# Patient Record
Sex: MALE | Race: WHITE | Employment: FULL TIME | URBAN - METROPOLITAN AREA
[De-identification: names, ages, dates, MRNs, and addresses within clinical notes are randomized per-mention and may not be internally consistent; named-entity substitution may affect disease eponyms.]

---

## 2017-07-05 ENCOUNTER — TELEPHONE (OUTPATIENT)
Dept: FAMILY MEDICINE CLINIC | Facility: CLINIC | Age: 42
End: 2017-07-05

## 2017-07-05 ENCOUNTER — HOSPITAL ENCOUNTER (OUTPATIENT)
Dept: GENERAL RADIOLOGY | Age: 42
Discharge: HOME | End: 2017-07-05
Attending: FAMILY MEDICINE
Payer: COMMERCIAL

## 2017-07-05 DIAGNOSIS — R07.89 ATYPICAL CHEST PAIN: ICD-10-CM

## 2017-07-05 DIAGNOSIS — R50.9 FEVER, UNSPECIFIED FEVER CAUSE: ICD-10-CM

## 2017-07-05 PROCEDURE — 71020 XR CHEST PA + LAT CHEST (CPT=71020): CPT | Performed by: FAMILY MEDICINE

## 2017-07-05 NOTE — TELEPHONE ENCOUNTER
Heather at [facility]  Radiology calling. Stat chest x ray is negative. Agreed to note and inform .

## 2017-07-05 NOTE — PROGRESS NOTES
HPI:   Julene Schirmer is a 43year old male who presents with dyspnea and med follow up     Pt reports it started after a deep tissue massage one week ago ; dyspnea, pain with moving and coughing   + sick contacts  Pt denies c/c  No ear pain   No throat pain apparent distress  CARDIO: RRR without murmur  LUNGS: clear to auscultation  NECK: supple,no adenopathy  HEENT: atraumatic, normocephalic,ears and throat are clear  EYES:PERRLA, EOMI, normal,conjunctiva are clear  SKIN: norashes,no suspicious lesions  GI:

## 2017-07-12 ENCOUNTER — LAB ENCOUNTER (OUTPATIENT)
Dept: LAB | Age: 42
End: 2017-07-12
Attending: FAMILY MEDICINE
Payer: COMMERCIAL

## 2017-07-12 DIAGNOSIS — E78.00 ELEVATED CHOLESTEROL: ICD-10-CM

## 2017-07-12 DIAGNOSIS — R50.9 FEVER, UNSPECIFIED FEVER CAUSE: ICD-10-CM

## 2017-07-12 DIAGNOSIS — F41.9 ANXIETY: ICD-10-CM

## 2017-07-12 LAB
25-HYDROXYVITAMIN D (TOTAL): 31.7 NG/ML (ref 30–100)
ALBUMIN SERPL-MCNC: 4.1 G/DL (ref 3.5–4.8)
ALP LIVER SERPL-CCNC: 53 U/L (ref 45–117)
ALT SERPL-CCNC: 43 U/L (ref 17–63)
AST SERPL-CCNC: 15 U/L (ref 15–41)
BASOPHILS # BLD AUTO: 0.05 X10(3) UL (ref 0–0.1)
BASOPHILS NFR BLD AUTO: 0.7 %
BILIRUB SERPL-MCNC: 0.8 MG/DL (ref 0.1–2)
BUN BLD-MCNC: 19 MG/DL (ref 8–20)
CALCIUM BLD-MCNC: 9.6 MG/DL (ref 8.3–10.3)
CHLORIDE: 102 MMOL/L (ref 101–111)
CHOLEST SMN-MCNC: 149 MG/DL (ref ?–200)
CO2: 29 MMOL/L (ref 22–32)
CREAT BLD-MCNC: 1.26 MG/DL (ref 0.7–1.3)
EOSINOPHIL # BLD AUTO: 0.36 X10(3) UL (ref 0–0.3)
EOSINOPHIL NFR BLD AUTO: 5 %
ERYTHROCYTE [DISTWIDTH] IN BLOOD BY AUTOMATED COUNT: 12.7 % (ref 11.5–16)
FREE T4: 1.1 NG/DL (ref 0.9–1.8)
GLUCOSE BLD-MCNC: 97 MG/DL (ref 70–99)
HAV AB SERPL IA-ACNC: 1010 PG/ML (ref 193–986)
HCT VFR BLD AUTO: 45.1 % (ref 37–53)
HDLC SERPL-MCNC: 43 MG/DL (ref 45–?)
HDLC SERPL: 3.47 {RATIO} (ref ?–4.97)
HGB BLD-MCNC: 14.9 G/DL (ref 13–17)
IMMATURE GRANULOCYTE COUNT: 0.06 X10(3) UL (ref 0–1)
IMMATURE GRANULOCYTE RATIO %: 0.8 %
LDLC SERPL CALC-MCNC: 81 MG/DL (ref ?–130)
LYMPHOCYTES # BLD AUTO: 2.52 X10(3) UL (ref 0.9–4)
LYMPHOCYTES NFR BLD AUTO: 34.9 %
M PROTEIN MFR SERPL ELPH: 8 G/DL (ref 6.1–8.3)
MCH RBC QN AUTO: 28.5 PG (ref 27–33.2)
MCHC RBC AUTO-ENTMCNC: 33 G/DL (ref 31–37)
MCV RBC AUTO: 86.2 FL (ref 80–99)
MONOCYTES # BLD AUTO: 0.68 X10(3) UL (ref 0.1–0.6)
MONOCYTES NFR BLD AUTO: 9.4 %
NEUTROPHIL ABS PRELIM: 3.56 X10 (3) UL (ref 1.3–6.7)
NEUTROPHILS # BLD AUTO: 3.56 X10(3) UL (ref 1.3–6.7)
NEUTROPHILS NFR BLD AUTO: 49.2 %
NONHDLC SERPL-MCNC: 106 MG/DL (ref ?–130)
PLATELET # BLD AUTO: 276 10(3)UL (ref 150–450)
POTASSIUM SERPL-SCNC: 4.7 MMOL/L (ref 3.6–5.1)
RBC # BLD AUTO: 5.23 X10(6)UL (ref 4.3–5.7)
RED CELL DISTRIBUTION WIDTH-SD: 39.6 FL (ref 35.1–46.3)
SODIUM SERPL-SCNC: 139 MMOL/L (ref 136–144)
TRIGLYCERIDES: 126 MG/DL (ref ?–150)
TSI SER-ACNC: 1.5 MIU/ML (ref 0.35–5.5)
VLDL: 25 MG/DL (ref 5–40)
WBC # BLD AUTO: 7.2 X10(3) UL (ref 4–13)

## 2017-07-12 PROCEDURE — 36415 COLL VENOUS BLD VENIPUNCTURE: CPT | Performed by: FAMILY MEDICINE

## 2017-07-12 PROCEDURE — 80050 GENERAL HEALTH PANEL: CPT | Performed by: FAMILY MEDICINE

## 2017-07-12 PROCEDURE — 82306 VITAMIN D 25 HYDROXY: CPT | Performed by: FAMILY MEDICINE

## 2017-07-12 PROCEDURE — 84439 ASSAY OF FREE THYROXINE: CPT | Performed by: FAMILY MEDICINE

## 2017-07-12 PROCEDURE — 80061 LIPID PANEL: CPT | Performed by: FAMILY MEDICINE

## 2017-07-12 PROCEDURE — 82607 VITAMIN B-12: CPT | Performed by: FAMILY MEDICINE

## 2017-10-27 ENCOUNTER — OFFICE VISIT (OUTPATIENT)
Dept: FAMILY MEDICINE CLINIC | Facility: CLINIC | Age: 42
End: 2017-10-27

## 2017-10-27 VITALS
TEMPERATURE: 98 F | BODY MASS INDEX: 28.84 KG/M2 | WEIGHT: 206 LBS | RESPIRATION RATE: 18 BRPM | HEIGHT: 71 IN | DIASTOLIC BLOOD PRESSURE: 72 MMHG | SYSTOLIC BLOOD PRESSURE: 124 MMHG | OXYGEN SATURATION: 97 % | HEART RATE: 74 BPM

## 2017-10-27 DIAGNOSIS — R53.83 OTHER FATIGUE: ICD-10-CM

## 2017-10-27 DIAGNOSIS — R06.83 SNORING: Primary | ICD-10-CM

## 2017-10-27 DIAGNOSIS — E66.3 OVERWEIGHT: ICD-10-CM

## 2017-10-27 PROCEDURE — 99213 OFFICE O/P EST LOW 20 MIN: CPT | Performed by: PHYSICIAN ASSISTANT

## 2017-10-27 PROCEDURE — 90686 IIV4 VACC NO PRSV 0.5 ML IM: CPT | Performed by: PHYSICIAN ASSISTANT

## 2017-10-27 PROCEDURE — 90471 IMMUNIZATION ADMIN: CPT | Performed by: PHYSICIAN ASSISTANT

## 2017-10-27 NOTE — PROGRESS NOTES
HPI:    Patient ID: Bijal Stanton is a 43year old male. HPI  Pt presents to clinic to discuss sleep study. Has been told he snores. Getting progressively louder. Has been told he stops breathing during sleep and wakes up for gasping for air.  He has trie atraumatic. Right Ear: External ear normal.   Left Ear: External ear normal.   Mouth/Throat: Oropharynx is clear and moist.   Tonsils are absent. Neck: Neck supple. Neck circumference 15.25 inches.     Cardiovascular: Normal rate, regular rhythm and

## 2017-12-17 DIAGNOSIS — F41.9 ANXIETY: ICD-10-CM

## 2017-12-17 DIAGNOSIS — E78.00 ELEVATED CHOLESTEROL: ICD-10-CM

## 2017-12-18 RX ORDER — ROSUVASTATIN CALCIUM 20 MG/1
20 TABLET, COATED ORAL NIGHTLY
Qty: 90 TABLET | Refills: 0 | Status: SHIPPED | OUTPATIENT
Start: 2017-12-18 | End: 2018-07-20

## 2017-12-18 RX ORDER — PAROXETINE HYDROCHLORIDE 40 MG/1
40 TABLET, FILM COATED ORAL EVERY EVENING
Qty: 90 TABLET | Refills: 0 | Status: SHIPPED | OUTPATIENT
Start: 2017-12-18 | End: 2018-07-20

## 2017-12-18 NOTE — TELEPHONE ENCOUNTER
From: Shadi   Sent: 12/17/2017 5:54 PM CST  Subject: Medication Renewal Request    Deja Taylor.  Mariah Hurtado would like a refill of the following medications:     PARoxetine HCl 40 MG Oral Tab Lauren Vides DOJasmin     Rosuvastatin Calcium 20 MG Oral Tab Federico Plasencia

## 2018-01-15 ENCOUNTER — OFFICE VISIT (OUTPATIENT)
Dept: FAMILY MEDICINE CLINIC | Facility: CLINIC | Age: 43
End: 2018-01-15

## 2018-01-15 ENCOUNTER — APPOINTMENT (OUTPATIENT)
Dept: LAB | Age: 43
End: 2018-01-15
Attending: INTERNAL MEDICINE
Payer: COMMERCIAL

## 2018-01-15 VITALS
WEIGHT: 210 LBS | HEIGHT: 71 IN | RESPIRATION RATE: 20 BRPM | TEMPERATURE: 99 F | SYSTOLIC BLOOD PRESSURE: 124 MMHG | OXYGEN SATURATION: 97 % | HEART RATE: 85 BPM | BODY MASS INDEX: 29.4 KG/M2 | DIASTOLIC BLOOD PRESSURE: 82 MMHG

## 2018-01-15 DIAGNOSIS — R06.02 SHORTNESS OF BREATH: ICD-10-CM

## 2018-01-15 DIAGNOSIS — M54.6 ACUTE BILATERAL THORACIC BACK PAIN: ICD-10-CM

## 2018-01-15 DIAGNOSIS — M54.6 ACUTE BILATERAL THORACIC BACK PAIN: Primary | ICD-10-CM

## 2018-01-15 LAB — D-DIMER: 0.31 UG/ML FEU (ref 0–0.49)

## 2018-01-15 PROCEDURE — 85378 FIBRIN DEGRADE SEMIQUANT: CPT | Performed by: INTERNAL MEDICINE

## 2018-01-15 PROCEDURE — 99214 OFFICE O/P EST MOD 30 MIN: CPT | Performed by: INTERNAL MEDICINE

## 2018-01-15 PROCEDURE — 36415 COLL VENOUS BLD VENIPUNCTURE: CPT | Performed by: INTERNAL MEDICINE

## 2018-01-15 RX ORDER — PREDNISONE 20 MG/1
TABLET ORAL
Qty: 8 TABLET | Refills: 0 | Status: SHIPPED | OUTPATIENT
Start: 2018-01-15 | End: 2018-04-03 | Stop reason: ALTCHOICE

## 2018-01-15 RX ORDER — IBUPROFEN 800 MG/1
800 TABLET ORAL
Qty: 10 TABLET | Refills: 0 | Status: SHIPPED | OUTPATIENT
Start: 2018-01-15 | End: 2018-01-20

## 2018-01-16 ENCOUNTER — TELEPHONE (OUTPATIENT)
Dept: FAMILY MEDICINE CLINIC | Facility: CLINIC | Age: 43
End: 2018-01-16

## 2018-01-16 NOTE — PROGRESS NOTES
Leanna Bejarano is a 43year old male. HPI:   Reports bilateral middle back pain only felt with laying down at night. Might have some slight shortness of breath with it sometimes, not always.  Reports history of pleurisy about 5 months ago that was similar bu NECK: supple,no adenopathy   LUNGS: CTA, easy breathing  CV: normal S1S2, RRR without murmur  GI: good BS's,no masses or tenderness  MS: no thoracolumbar spinal pain or paraspinal pain on palpation; no swelling; laying supine does not antagonize pain; no

## 2018-02-25 ENCOUNTER — OFFICE VISIT (OUTPATIENT)
Dept: SLEEP CENTER | Facility: HOSPITAL | Age: 43
End: 2018-02-25
Attending: PHYSICIAN ASSISTANT
Payer: COMMERCIAL

## 2018-02-25 PROCEDURE — 95810 POLYSOM 6/> YRS 4/> PARAM: CPT

## 2018-03-02 NOTE — PROCEDURES
[address] [address],[facility] [address]       Accredited by the [facility] ([facility])    PATIENT'S NAME:        [place] Divers  ATTENDING PHYSICIAN:   Cherise Cross M.D.   REFERRING PHYSICIAN:   VIPIN Will significant REM predominance. The oxygen divya is 80%. The patient spent 96% of the record with a saturation of greater than 90%. PERIODIC LIMB MOVEMENTS:  PLM index is 7. PLM arousal index is 4.     EEG:  With the limited recording montage, no EEG ab

## 2018-07-20 DIAGNOSIS — E78.00 ELEVATED CHOLESTEROL: ICD-10-CM

## 2018-07-20 DIAGNOSIS — F41.9 ANXIETY: ICD-10-CM

## 2018-07-20 RX ORDER — ROSUVASTATIN CALCIUM 20 MG/1
TABLET, COATED ORAL
Qty: 22 TABLET | Refills: 0 | Status: SHIPPED | OUTPATIENT
Start: 2018-07-20 | End: 2018-09-27

## 2018-07-20 RX ORDER — PAROXETINE HYDROCHLORIDE 40 MG/1
TABLET, FILM COATED ORAL
Qty: 22 TABLET | Refills: 0 | Status: SHIPPED | OUTPATIENT
Start: 2018-07-20 | End: 2018-09-27

## 2018-09-27 DIAGNOSIS — F41.9 ANXIETY: ICD-10-CM

## 2018-09-27 DIAGNOSIS — E78.00 ELEVATED CHOLESTEROL: ICD-10-CM

## 2018-09-28 RX ORDER — PAROXETINE HYDROCHLORIDE 40 MG/1
40 TABLET, FILM COATED ORAL EVERY MORNING
Qty: 30 TABLET | Refills: 0 | Status: SHIPPED | OUTPATIENT
Start: 2018-09-28 | End: 2019-01-06

## 2018-09-28 RX ORDER — ROSUVASTATIN CALCIUM 20 MG/1
20 TABLET, COATED ORAL NIGHTLY
Qty: 30 TABLET | Refills: 0 | Status: SHIPPED | OUTPATIENT
Start: 2018-09-28 | End: 2019-01-15

## 2018-11-26 ENCOUNTER — LAB ENCOUNTER (OUTPATIENT)
Dept: LAB | Age: 43
End: 2018-11-26
Attending: PHYSICIAN ASSISTANT
Payer: COMMERCIAL

## 2018-11-26 ENCOUNTER — OFFICE VISIT (OUTPATIENT)
Dept: FAMILY MEDICINE CLINIC | Facility: CLINIC | Age: 43
End: 2018-11-26
Payer: COMMERCIAL

## 2018-11-26 VITALS
WEIGHT: 186.63 LBS | HEIGHT: 71 IN | DIASTOLIC BLOOD PRESSURE: 80 MMHG | OXYGEN SATURATION: 98 % | HEART RATE: 68 BPM | TEMPERATURE: 98 F | BODY MASS INDEX: 26.13 KG/M2 | SYSTOLIC BLOOD PRESSURE: 118 MMHG | RESPIRATION RATE: 20 BRPM

## 2018-11-26 DIAGNOSIS — R10.11 RUQ ABDOMINAL PAIN: ICD-10-CM

## 2018-11-26 DIAGNOSIS — E78.01 FAMILIAL HYPERCHOLESTEROLEMIA: ICD-10-CM

## 2018-11-26 DIAGNOSIS — R10.11 RUQ ABDOMINAL PAIN: Primary | ICD-10-CM

## 2018-11-26 DIAGNOSIS — Z23 NEED FOR VACCINATION: ICD-10-CM

## 2018-11-26 PROCEDURE — 80053 COMPREHEN METABOLIC PANEL: CPT | Performed by: PHYSICIAN ASSISTANT

## 2018-11-26 PROCEDURE — 90686 IIV4 VACC NO PRSV 0.5 ML IM: CPT | Performed by: PHYSICIAN ASSISTANT

## 2018-11-26 PROCEDURE — 85025 COMPLETE CBC W/AUTO DIFF WBC: CPT | Performed by: PHYSICIAN ASSISTANT

## 2018-11-26 PROCEDURE — 90471 IMMUNIZATION ADMIN: CPT | Performed by: PHYSICIAN ASSISTANT

## 2018-11-26 PROCEDURE — 80074 ACUTE HEPATITIS PANEL: CPT | Performed by: PHYSICIAN ASSISTANT

## 2018-11-26 PROCEDURE — 36415 COLL VENOUS BLD VENIPUNCTURE: CPT | Performed by: PHYSICIAN ASSISTANT

## 2018-11-26 PROCEDURE — 83690 ASSAY OF LIPASE: CPT | Performed by: PHYSICIAN ASSISTANT

## 2018-11-26 PROCEDURE — 99214 OFFICE O/P EST MOD 30 MIN: CPT | Performed by: PHYSICIAN ASSISTANT

## 2018-11-26 PROCEDURE — 80061 LIPID PANEL: CPT | Performed by: PHYSICIAN ASSISTANT

## 2018-11-26 NOTE — PROGRESS NOTES
Castillo Brenner is a 37year old male who presents with abdominal pain. Pain is located at RUQ. Pain is described as pressure, feels like there is something under his right ribs that is enlarged and doesn't fit. Severity is off and on.  Associated symptoms: date: 4/25/1993      Smokeless tobacco: Former User        Types: Chew    Alcohol use: Yes    Drug use: No        REVIEW OF SYSTEMS:   GENERAL: no lethargy, no fever, no chills  SKIN: no rash.   HEENT: denies congestion  LUNGS: denies shortness of breath or

## 2018-12-01 ENCOUNTER — HOSPITAL ENCOUNTER (OUTPATIENT)
Dept: ULTRASOUND IMAGING | Age: 43
Discharge: HOME | End: 2018-12-01
Attending: PHYSICIAN ASSISTANT
Payer: COMMERCIAL

## 2018-12-01 DIAGNOSIS — R10.11 RUQ ABDOMINAL PAIN: ICD-10-CM

## 2018-12-01 PROCEDURE — 76700 US EXAM ABDOM COMPLETE: CPT | Performed by: PHYSICIAN ASSISTANT

## 2018-12-20 ENCOUNTER — OFFICE VISIT (OUTPATIENT)
Dept: NEPHROLOGY | Facility: CLINIC | Age: 43
End: 2018-12-20
Payer: COMMERCIAL

## 2018-12-20 VITALS — BODY MASS INDEX: 25 KG/M2 | WEIGHT: 181 LBS | DIASTOLIC BLOOD PRESSURE: 88 MMHG | SYSTOLIC BLOOD PRESSURE: 120 MMHG

## 2018-12-20 DIAGNOSIS — N28.89 RENAL MASS: Primary | ICD-10-CM

## 2018-12-20 DIAGNOSIS — R10.11 RIGHT UPPER QUADRANT ABDOMINAL PAIN: ICD-10-CM

## 2018-12-20 PROCEDURE — 99243 OFF/OP CNSLTJ NEW/EST LOW 30: CPT | Performed by: INTERNAL MEDICINE

## 2018-12-20 NOTE — PROGRESS NOTES
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     Arun Solano wheezes, rales, rhonchi. Abdomen: Soft, non-tender. + bowel sounds, no palpable organomegaly  Extremities: Without clubbing, cyanosis or edema.   Neurologic:  normal affect, cranial nerves grossly intact, moving all extremities  Skin: Warm and dry, no ra

## 2018-12-26 ENCOUNTER — HOSPITAL ENCOUNTER (OUTPATIENT)
Dept: CT IMAGING | Facility: HOSPITAL | Age: 43
Discharge: HOME | End: 2018-12-26
Attending: INTERNAL MEDICINE
Payer: COMMERCIAL

## 2018-12-26 DIAGNOSIS — R10.11 RIGHT UPPER QUADRANT ABDOMINAL PAIN: ICD-10-CM

## 2018-12-26 DIAGNOSIS — N28.89 RENAL MASS: ICD-10-CM

## 2018-12-26 PROCEDURE — 74170 CT ABD WO CNTRST FLWD CNTRST: CPT | Performed by: INTERNAL MEDICINE

## 2018-12-26 PROCEDURE — 82565 ASSAY OF CREATININE: CPT

## 2018-12-26 NOTE — IMAGING NOTE
Called to CT with pt c/o itchy nose. Upon RN arrival, symptoms resolved. Pt denies all related symptoms. No rash/hives seen by RN. IV removed and pt discharged. Instructions given to continue to monitor and is he develops SOB, call 911.  Verbalized underst

## 2018-12-27 ENCOUNTER — TELEPHONE (OUTPATIENT)
Dept: NEPHROLOGY | Facility: CLINIC | Age: 43
End: 2018-12-27

## 2019-01-06 DIAGNOSIS — F41.9 ANXIETY: ICD-10-CM

## 2019-01-07 RX ORDER — PAROXETINE HYDROCHLORIDE 40 MG/1
40 TABLET, FILM COATED ORAL EVERY MORNING
Qty: 30 TABLET | Refills: 0 | Status: SHIPPED | OUTPATIENT
Start: 2019-01-07

## 2019-01-08 DIAGNOSIS — F41.9 ANXIETY: ICD-10-CM

## 2019-01-08 RX ORDER — PAROXETINE HYDROCHLORIDE 40 MG/1
TABLET, FILM COATED ORAL
Qty: 30 TABLET | Refills: 0 | OUTPATIENT
Start: 2019-01-08

## 2019-01-17 PROBLEM — F41.3 OTHER MIXED ANXIETY DISORDERS: Status: ACTIVE | Noted: 2019-01-17

## 2019-01-17 PROBLEM — F10.10 ALCOHOL USE DISORDER, MILD, ABUSE: Status: ACTIVE | Noted: 2019-01-17

## 2022-01-12 ENCOUNTER — OFFICE VISIT (OUTPATIENT)
Dept: FAMILY MEDICINE CLINIC | Facility: CLINIC | Age: 47
End: 2022-01-12
Payer: COMMERCIAL

## 2022-01-12 VITALS
DIASTOLIC BLOOD PRESSURE: 82 MMHG | HEIGHT: 71 IN | HEART RATE: 62 BPM | OXYGEN SATURATION: 99 % | WEIGHT: 196 LBS | BODY MASS INDEX: 27.44 KG/M2 | RESPIRATION RATE: 16 BRPM | SYSTOLIC BLOOD PRESSURE: 114 MMHG

## 2022-01-12 DIAGNOSIS — Z12.11 COLON CANCER SCREENING: ICD-10-CM

## 2022-01-12 DIAGNOSIS — Z00.00 ANNUAL PHYSICAL EXAM: Primary | ICD-10-CM

## 2022-01-12 DIAGNOSIS — Z23 NEED FOR VACCINATION: ICD-10-CM

## 2022-01-12 PROCEDURE — 3008F BODY MASS INDEX DOCD: CPT | Performed by: FAMILY MEDICINE

## 2022-01-12 PROCEDURE — 90471 IMMUNIZATION ADMIN: CPT | Performed by: FAMILY MEDICINE

## 2022-01-12 PROCEDURE — 99396 PREV VISIT EST AGE 40-64: CPT | Performed by: FAMILY MEDICINE

## 2022-01-12 PROCEDURE — 3074F SYST BP LT 130 MM HG: CPT | Performed by: FAMILY MEDICINE

## 2022-01-12 PROCEDURE — 3079F DIAST BP 80-89 MM HG: CPT | Performed by: FAMILY MEDICINE

## 2022-01-12 PROCEDURE — 90686 IIV4 VACC NO PRSV 0.5 ML IM: CPT | Performed by: FAMILY MEDICINE

## 2022-01-12 RX ORDER — HYDROXYZINE 50 MG/1
TABLET, FILM COATED ORAL
COMMUNITY
Start: 2022-01-09

## 2022-01-12 NOTE — PROGRESS NOTES
Slim Peterson is a 55year old male who presents for a complete physical exam.   HPI:     Pt complains of nothing   No calcium and vit D   No urinary symptoms  No erectile dysfunction  No penile discharge  DENISE - on cpap   No c-scope yet  Dad h/o colon po Cigarettes        Start date: 4/25/1993      Smokeless tobacco: Former User        Types: Chew    Alcohol use: Yes      Alcohol/week: 5.0 standard drinks      Types: 5 Shots of liquor per week    Drug use: No     Occ: . : .  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  Colon can

## 2023-08-28 ENCOUNTER — OFFICE VISIT (OUTPATIENT)
Dept: FAMILY MEDICINE CLINIC | Facility: CLINIC | Age: 48
End: 2023-08-28
Payer: COMMERCIAL

## 2023-08-28 ENCOUNTER — TELEPHONE (OUTPATIENT)
Dept: FAMILY MEDICINE CLINIC | Facility: CLINIC | Age: 48
End: 2023-08-28

## 2023-08-28 ENCOUNTER — LAB ENCOUNTER (OUTPATIENT)
Dept: LAB | Age: 48
End: 2023-08-28
Attending: NURSE PRACTITIONER
Payer: COMMERCIAL

## 2023-08-28 VITALS
SYSTOLIC BLOOD PRESSURE: 112 MMHG | HEIGHT: 71 IN | WEIGHT: 207.81 LBS | BODY MASS INDEX: 29.09 KG/M2 | RESPIRATION RATE: 16 BRPM | HEART RATE: 64 BPM | DIASTOLIC BLOOD PRESSURE: 68 MMHG | TEMPERATURE: 97 F

## 2023-08-28 DIAGNOSIS — Z12.5 SCREENING FOR PROSTATE CANCER: ICD-10-CM

## 2023-08-28 DIAGNOSIS — Z00.00 LABORATORY EXAMINATION ORDERED AS PART OF A ROUTINE GENERAL MEDICAL EXAMINATION: ICD-10-CM

## 2023-08-28 DIAGNOSIS — G47.33 OSA ON CPAP: ICD-10-CM

## 2023-08-28 DIAGNOSIS — Z23 NEED FOR VACCINATION: ICD-10-CM

## 2023-08-28 DIAGNOSIS — E78.01 FAMILIAL HYPERCHOLESTEROLEMIA: ICD-10-CM

## 2023-08-28 DIAGNOSIS — Z00.00 ANNUAL PHYSICAL EXAM: Primary | ICD-10-CM

## 2023-08-28 DIAGNOSIS — Z12.11 SCREENING FOR MALIGNANT NEOPLASM OF COLON: ICD-10-CM

## 2023-08-28 LAB
ALBUMIN SERPL-MCNC: 4 G/DL (ref 3.4–5)
ALBUMIN/GLOB SERPL: 1.2 {RATIO} (ref 1–2)
ALP LIVER SERPL-CCNC: 54 U/L
ALT SERPL-CCNC: 27 U/L
ANION GAP SERPL CALC-SCNC: 5 MMOL/L (ref 0–18)
AST SERPL-CCNC: 13 U/L (ref 15–37)
BASOPHILS # BLD AUTO: 0.08 X10(3) UL (ref 0–0.2)
BASOPHILS NFR BLD AUTO: 1.7 %
BILIRUB SERPL-MCNC: 0.6 MG/DL (ref 0.1–2)
BILIRUB UR QL STRIP.AUTO: NEGATIVE
BUN BLD-MCNC: 20 MG/DL (ref 7–18)
CALCIUM BLD-MCNC: 9 MG/DL (ref 8.5–10.1)
CHLORIDE SERPL-SCNC: 106 MMOL/L (ref 98–112)
CHOLEST SERPL-MCNC: 235 MG/DL (ref ?–200)
CLARITY UR REFRACT.AUTO: CLEAR
CO2 SERPL-SCNC: 28 MMOL/L (ref 21–32)
COMPLEXED PSA SERPL-MCNC: 0.89 NG/ML (ref ?–4)
CREAT BLD-MCNC: 1.25 MG/DL
EGFRCR SERPLBLD CKD-EPI 2021: 71 ML/MIN/1.73M2 (ref 60–?)
EOSINOPHIL # BLD AUTO: 0.11 X10(3) UL (ref 0–0.7)
EOSINOPHIL NFR BLD AUTO: 2.4 %
ERYTHROCYTE [DISTWIDTH] IN BLOOD BY AUTOMATED COUNT: 13.6 %
FASTING PATIENT LIPID ANSWER: YES
FASTING STATUS PATIENT QL REPORTED: YES
GLOBULIN PLAS-MCNC: 3.3 G/DL (ref 2.8–4.4)
GLUCOSE BLD-MCNC: 107 MG/DL (ref 70–99)
GLUCOSE UR STRIP.AUTO-MCNC: NORMAL MG/DL
HCT VFR BLD AUTO: 42.7 %
HDLC SERPL-MCNC: 44 MG/DL (ref 40–59)
HGB BLD-MCNC: 14.2 G/DL
IMM GRANULOCYTES # BLD AUTO: 0 X10(3) UL (ref 0–1)
IMM GRANULOCYTES NFR BLD: 0 %
KETONES UR STRIP.AUTO-MCNC: NEGATIVE MG/DL
LDLC SERPL CALC-MCNC: 173 MG/DL (ref ?–100)
LEUKOCYTE ESTERASE UR QL STRIP.AUTO: NEGATIVE
LYMPHOCYTES # BLD AUTO: 1.54 X10(3) UL (ref 1–4)
LYMPHOCYTES NFR BLD AUTO: 33.4 %
MCH RBC QN AUTO: 28.7 PG (ref 26–34)
MCHC RBC AUTO-ENTMCNC: 33.3 G/DL (ref 31–37)
MCV RBC AUTO: 86.4 FL
MONOCYTES # BLD AUTO: 0.32 X10(3) UL (ref 0.1–1)
MONOCYTES NFR BLD AUTO: 6.9 %
NEUTROPHILS # BLD AUTO: 2.56 X10 (3) UL (ref 1.5–7.7)
NEUTROPHILS # BLD AUTO: 2.56 X10(3) UL (ref 1.5–7.7)
NEUTROPHILS NFR BLD AUTO: 55.6 %
NITRITE UR QL STRIP.AUTO: NEGATIVE
NONHDLC SERPL-MCNC: 191 MG/DL (ref ?–130)
OSMOLALITY SERPL CALC.SUM OF ELEC: 291 MOSM/KG (ref 275–295)
PH UR STRIP.AUTO: 7 [PH] (ref 5–8)
PLATELET # BLD AUTO: 218 10(3)UL (ref 150–450)
POTASSIUM SERPL-SCNC: 4.2 MMOL/L (ref 3.5–5.1)
PROT SERPL-MCNC: 7.3 G/DL (ref 6.4–8.2)
PROT UR STRIP.AUTO-MCNC: NEGATIVE MG/DL
RBC # BLD AUTO: 4.94 X10(6)UL
RBC UR QL AUTO: NEGATIVE
SODIUM SERPL-SCNC: 139 MMOL/L (ref 136–145)
SP GR UR STRIP.AUTO: 1.01 (ref 1–1.03)
TRIGL SERPL-MCNC: 99 MG/DL (ref 30–149)
TSI SER-ACNC: 1.28 MIU/ML (ref 0.36–3.74)
UROBILINOGEN UR STRIP.AUTO-MCNC: NORMAL MG/DL
VLDLC SERPL CALC-MCNC: 20 MG/DL (ref 0–30)
WBC # BLD AUTO: 4.6 X10(3) UL (ref 4–11)

## 2023-08-28 PROCEDURE — 85025 COMPLETE CBC W/AUTO DIFF WBC: CPT

## 2023-08-28 PROCEDURE — 84443 ASSAY THYROID STIM HORMONE: CPT

## 2023-08-28 PROCEDURE — 80053 COMPREHEN METABOLIC PANEL: CPT

## 2023-08-28 PROCEDURE — 80061 LIPID PANEL: CPT

## 2023-08-28 PROCEDURE — 81003 URINALYSIS AUTO W/O SCOPE: CPT

## 2023-08-29 DIAGNOSIS — E78.00 ELEVATED CHOLESTEROL: Primary | ICD-10-CM

## 2023-08-29 DIAGNOSIS — R74.02 ELEVATED LDH: ICD-10-CM

## 2023-09-07 ENCOUNTER — PATIENT MESSAGE (OUTPATIENT)
Dept: FAMILY MEDICINE CLINIC | Facility: CLINIC | Age: 48
End: 2023-09-07

## 2023-09-08 NOTE — TELEPHONE ENCOUNTER
From: Benjamín Shane  To: ALEXA العلي  Sent: 9/7/2023 7:51 PM CDT  Subject: Attn: Mya - [employer] Fax Number    Max Roque,    Thank you for getting the [employer] paperwork all fixed up. The fax number to send it to is:  [phone]    Thank you!   Chelsea Jaramillo

## 2023-09-09 ENCOUNTER — HOSPITAL ENCOUNTER (OUTPATIENT)
Dept: CT IMAGING | Age: 48
Discharge: HOME | End: 2023-09-09
Attending: FAMILY MEDICINE

## 2023-09-09 DIAGNOSIS — Z13.6 SCREENING FOR HEART DISEASE: ICD-10-CM

## 2023-09-20 DIAGNOSIS — E78.01 FAMILIAL HYPERCHOLESTEROLEMIA: Primary | ICD-10-CM

## 2023-09-20 DIAGNOSIS — E78.00 HYPERCHOLESTEROLEMIA: ICD-10-CM

## 2023-09-20 RX ORDER — ROSUVASTATIN CALCIUM 10 MG/1
10 TABLET, COATED ORAL NIGHTLY
Qty: 90 TABLET | Refills: 0 | Status: SHIPPED | OUTPATIENT
Start: 2023-09-20

## 2023-09-26 PROBLEM — Z12.11 SPECIAL SCREENING FOR MALIGNANT NEOPLASM OF COLON: Status: ACTIVE | Noted: 2023-09-26

## 2023-10-09 ENCOUNTER — OFFICE VISIT (OUTPATIENT)
Facility: CLINIC | Age: 48
End: 2023-10-09
Payer: COMMERCIAL

## 2023-10-09 VITALS
SYSTOLIC BLOOD PRESSURE: 116 MMHG | RESPIRATION RATE: 16 BRPM | DIASTOLIC BLOOD PRESSURE: 64 MMHG | OXYGEN SATURATION: 98 % | HEART RATE: 77 BPM | BODY MASS INDEX: 28.98 KG/M2 | TEMPERATURE: 98 F | HEIGHT: 71 IN | WEIGHT: 207 LBS

## 2023-10-09 DIAGNOSIS — G47.19 DAYTIME HYPERSOMNOLENCE: ICD-10-CM

## 2023-10-09 DIAGNOSIS — G47.33 OSA (OBSTRUCTIVE SLEEP APNEA): Primary | ICD-10-CM

## 2023-10-10 ENCOUNTER — TELEPHONE (OUTPATIENT)
Facility: CLINIC | Age: 48
End: 2023-10-10

## 2023-10-10 DIAGNOSIS — G47.33 OSA (OBSTRUCTIVE SLEEP APNEA): Primary | ICD-10-CM

## 2023-10-10 NOTE — TELEPHONE ENCOUNTER
Arrange a NEW auto-titrating CPAP at 6-16 cwp at bedtime as patient is using and benefiting from CPAP machine

## 2023-11-01 ENCOUNTER — MED REC SCAN ONLY (OUTPATIENT)
Dept: FAMILY MEDICINE CLINIC | Facility: CLINIC | Age: 48
End: 2023-11-01

## 2023-12-15 ENCOUNTER — MED REC SCAN ONLY (OUTPATIENT)
Facility: CLINIC | Age: 48
End: 2023-12-15

## 2024-01-22 ENCOUNTER — MED REC SCAN ONLY (OUTPATIENT)
Facility: CLINIC | Age: 49
End: 2024-01-22

## 2024-02-08 ENCOUNTER — OFFICE VISIT (OUTPATIENT)
Facility: CLINIC | Age: 49
End: 2024-02-08
Payer: COMMERCIAL

## 2024-02-08 VITALS
DIASTOLIC BLOOD PRESSURE: 70 MMHG | SYSTOLIC BLOOD PRESSURE: 112 MMHG | OXYGEN SATURATION: 96 % | WEIGHT: 200 LBS | BODY MASS INDEX: 28 KG/M2 | HEIGHT: 71 IN | RESPIRATION RATE: 16 BRPM | HEART RATE: 72 BPM

## 2024-02-08 DIAGNOSIS — G47.33 OSA (OBSTRUCTIVE SLEEP APNEA): Primary | ICD-10-CM

## 2024-02-08 DIAGNOSIS — G47.19 DAYTIME HYPERSOMNOLENCE: ICD-10-CM

## 2024-02-08 DIAGNOSIS — F41.1 ANXIETY STATE: ICD-10-CM

## 2024-02-08 PROCEDURE — 3008F BODY MASS INDEX DOCD: CPT | Performed by: INTERNAL MEDICINE

## 2024-02-08 PROCEDURE — 99214 OFFICE O/P EST MOD 30 MIN: CPT | Performed by: INTERNAL MEDICINE

## 2024-02-08 PROCEDURE — 3074F SYST BP LT 130 MM HG: CPT | Performed by: INTERNAL MEDICINE

## 2024-02-08 PROCEDURE — 3078F DIAST BP <80 MM HG: CPT | Performed by: INTERNAL MEDICINE

## 2024-02-08 NOTE — PROGRESS NOTES
This is a 48 year old male who presents with the following symptoms, risk factors, behaviors or other items associated with sleep problems.    Sleep Apnea:   snoring; gasping/choking; stops breathing; non-refreshing sleep; current CPAP use  Insomnia:  anxiety; job stress  Restless Leg:  urge to move legs when trying to sleep  Parasomnias:   no symptoms of parasomnias  Daytime Problems:  no symptoms of daytime problems    The patient's [place] Sleepiness score is 4/24.

## 2024-02-08 NOTE — PROGRESS NOTES
Pulmonary/Critical Care/Sleep Medicine   Progress Note     PCP: José Rudd MD   Phone: [phone]   Fax: [phone]     Chief Complaint   Patient presents with    Follow - Up     6 month f/u       HPI  I had the pleasure of seeing David Echeverria who is a pleasant 48 year old male who presents for follow up of DENISE    The patient reports using auto CPAP  at 6-16 cmH2O regularly throughout the night for about 7 hours and states that the  PAP machine is quiet and has a heated humidifier.        The patient denies kicking legs at night. Denies  teeth grinding.         He drinks less than 1 pot  caffeine coffee daily       He has pets 2 dogs           Hx of tobacco use: He  reports that he quit smoking about 15 years ago. His smoking use included cigarettes. He started smoking about 30 years ago. He has a 12 pack-year smoking history. He has been exposed to tobacco smoke. He has quit using smokeless tobacco.  His smokeless tobacco use included chew.    Past Medical History:   Diagnosis Date    Anxiety     Depression     Flatulence/gas pain/belching     Food intolerance     Hemorrhoids     Hyperlipidemia 01/01/2000    Have been on Lipitor or Cressor    DENISE (obstructive sleep apnea) 02/25/18 PSG    AHI 42 Supine AHI 63 non-supine AHI 14 Sao2 Attila 80%     Sleep apnea 01/01/2018    Diagnosed this year    Stress       Past Surgical History:   Procedure Laterality Date    HERNIA SURGERY      age 20    TONSILLECTOMY       Allergies   Allergen Reactions    Penicillins HIVES    Honey Bee Venom [Bee Venom]      Swelling      Current Outpatient Medications   Medication Sig Dispense Refill    rosuvastatin (CRESTOR) 10 MG Oral Tab Take 1 tablet (10 mg total) by mouth nightly. 90 tablet 0    PEG 3350-KCl-Na Bicarb-NaCl 420 g Oral Recon Soln Take as directed by physician 4000 mL 0    hydrOXYzine 50 MG Oral Tab       PARoxetine HCl 40 MG Oral Tab Take 1 tablet (40 mg total) by mouth every morning. 30 tablet 0       Social History     Socioeconomic History    Marital status:    Tobacco Use    Smoking status: Former     Packs/day: 1.00     Years: 12.00     Additional pack years: 0.00     Total pack years: 12.00     Types: Cigarettes     Start date: 1993     Quit date: 2009     Years since quitting: 15.1     Passive exposure: Past    Smokeless tobacco: Former     Types: Chew    Tobacco comments:     Takes nicotine mints   Vaping Use    Vaping Use: Never used   Substance and Sexual Activity    Alcohol use: Not Currently    Drug use: No    Sexual activity: Yes     Partners: Female   Other Topics Concern    Caffeine Concern Yes     Comment: half a pot of coffee every morning    Exercise Yes     Comment: walking      Immunization History   Administered Date(s) Administered    Covid-19 Vaccine Pfizer 30 mcg/0.3 ml 2021, 2021, 2021    FLULAVAL 6 months & older 0.5 ml Prefilled syringe (46206) 10/27/2017, 2018, 2022    FLUZONE 6 months and older PFS 0.5 ml (92101) 10/27/2017, 2018    Flublok Quad Influenza Vaccine (49646) 10/25/2020    TDAP 2023      Family History   Problem Relation Age of Onset    Ulcerative Colitis Mother     High Blood Pressure Father          BRAIN HEMORAGE AGE 72     High Cholesterol Father     Cancer Maternal Grandmother     Other (LUPUS) Maternal Grandmother              Diabetes Maternal Grandfather             Cancer Paternal Grandmother              Suicide History Cousin         Review of Systems   Constitutional:  Negative for fatigue, fever and unexpected weight change.   HENT:  Negative for congestion, mouth sores, nosebleeds, postnasal drip, rhinorrhea, sore throat and trouble swallowing.    Eyes:  Negative for visual disturbance.   Respiratory:  Negative for apnea, cough, choking, chest tightness, shortness of breath and wheezing.    Cardiovascular:  Negative for chest pain, palpitations and leg swelling.    Gastrointestinal:  Negative for abdominal pain, constipation, diarrhea, nausea and vomiting.   Genitourinary:  Negative for difficulty urinating.   Musculoskeletal:  Negative for arthralgias, back pain, gait problem and myalgias.   Neurological:  Negative for dizziness, weakness and headaches.   Psychiatric/Behavioral:  Negative for sleep disturbance.        Vitals:    02/08/24 1509   BP: 112/70   Pulse: 72   Resp: 16      SpO2: 96 %  Ht Readings from Last 1 Encounters:   02/08/24 5' 11\" (1.803 m)     Wt Readings from Last 1 Encounters:   02/08/24 200 lb (90.7 kg)     Body mass index is 27.89 kg/m².     Physical Exam  Constitutional:       General: He is not in acute distress.     Appearance: Normal appearance. He is not ill-appearing or diaphoretic.   HENT:      Head: Normocephalic and atraumatic.      Nose: Nose normal. No congestion or rhinorrhea.      Comments: Narrow nares bilaterally      Mouth/Throat:      Mouth: Mucous membranes are moist.      Pharynx: Oropharynx is clear. No oropharyngeal exudate or posterior oropharyngeal erythema.      Comments: Mallampati class III palate   Eyes:      Extraocular Movements: Extraocular movements intact.      Pupils: Pupils are equal, round, and reactive to light.   Cardiovascular:      Rate and Rhythm: Normal rate.      Pulses: Normal pulses.      Heart sounds: Normal heart sounds. No murmur heard.  Pulmonary:      Effort: Pulmonary effort is normal. No respiratory distress.      Breath sounds: Normal breath sounds. No wheezing or rhonchi.   Chest:      Chest wall: No tenderness.   Abdominal:      General: Abdomen is flat. Bowel sounds are normal.      Palpations: Abdomen is soft.   Musculoskeletal:         General: Normal range of motion.   Skin:     General: Skin is warm.   Neurological:      General: No focal deficit present.      Mental Status: He is alert and oriented to person, place, and time.   Psychiatric:         Mood and Affect: Mood normal.          Behavior: Behavior normal.         Thought Content: Thought content normal.         Judgment: Judgment normal.              Labs:  Last BMP  Lab Results   Component Value Date     (H) 08/28/2023    BUN 20 (H) 08/28/2023    CREATSERUM 1.25 08/28/2023    BUNCREA 11.4 11/26/2018    ANIONGAP 5 08/28/2023    GFRAA 100 11/26/2018    GFRNAA 87 11/26/2018    CA 9.0 08/28/2023     08/28/2023    K 4.2 08/28/2023     08/28/2023    CO2 28.0 08/28/2023    OSMOCALC 291 08/28/2023      Last CBC  Lab Results   Component Value Date    WBC 4.6 08/28/2023    RBC 4.94 08/28/2023    HGB 14.2 08/28/2023    HCT 42.7 08/28/2023    MCV 86.4 08/28/2023    MCH 28.7 08/28/2023    MCHC 33.3 08/28/2023    RDW 13.6 08/28/2023    .0 08/28/2023      Last CMP  Lab Results   Component Value Date     (H) 08/28/2023    BUN 20 (H) 08/28/2023    BUNCREA 11.4 11/26/2018    CREATSERUM 1.25 08/28/2023    ANIONGAP 5 08/28/2023    GFR 70 07/12/2017    GFRNAA 87 11/26/2018    GFRAA 100 11/26/2018    CA 9.0 08/28/2023    OSMOCALC 291 08/28/2023    ALKPHO 54 08/28/2023    AST 13 (L) 08/28/2023    ALT 27 08/28/2023    BILT 0.6 08/28/2023    TP 7.3 08/28/2023    ALB 4.0 08/28/2023    GLOBULIN 3.3 08/28/2023     08/28/2023    K 4.2 08/28/2023     08/28/2023    CO2 28.0 08/28/2023      Last Thyroid Function  Lab Results   Component Value Date    T4F 1.1 07/12/2017    TSH 1.280 08/28/2023        Imaging:  [facility]       Accredited by the American Academy of Sleep Medicine (AASM)     PATIENT'S NAME:        POOJA VALENCIA JULISA  ATTENDING PHYSICIAN:   Alnozo Hernandez M.D.  REFERRING PHYSICIAN:   Nahed Qiu D.O.  PATIENT ACCOUNT #:     [id number]        LOCATION:       [facility]  MEDICAL RECORD #:      [id number]        YOB: 1975  DATE OF STUDY:         02/25/2018     SLEEP STUDY REPORT     STUDY TYPE:  Diagnostic polysomnography.      CLINICAL HISTORY:  The patient is a 42-year-old  with a history of snoring, witnessed apneas, and daytime sleepiness.  He has tried mandibular advancement devices in the past without success.     DIAGNOSTIC RECORDING PARAMETERS:  The patient underwent a formal polysomnographic evaluation at the [facility].  The study was acquired in compliance with the AASM Manual for the Scoring of Sleep and Associated Events.  The following parameters were monitored:  EOG, EEG, ECG, chin EMG, left and right tibialis EMG, snore microphone, an oronasal thermal sensor, nasal pressure transducer, respiratory inductance plethysmography, and oxygen saturation via continuous pulse oximetry.  In accordance with AASM recommendations, hypopnea events are scored based on an oxygen saturation more than or equal to 4 percent.  Body position is documented via technician notes every 15 minutes.         SLEEP ARCHITECTURE:  Total recording time 443 minutes, total sleep time 316 minutes, for a sleep efficiency of 71%.  Sleep stage breakdown as follows:  Stage 1 at 65%, slow-wave sleep at 8%, REM sleep at 7%.  Sleep onset latency is 23 minutes, REM onset latency 422 minutes, arousal index is 87.     RESPIRATORY MEASURES:  A total of 223 apneas and hypopneas were detected, all of which were obstructive in nature.  This corresponds to an apnea and hypopnea index of 42.  There is a positional variation, with a supine AHI of 63 versus a lateral AHI of 14.  No significant REM predominance.  The oxygen divya is 80%.  The patient spent 96% of the record with a saturation of greater than 90%.     PERIODIC LIMB MOVEMENTS:  PLM index is 7.  PLM arousal index is 4.     EEG:  With the limited recording montage, no EEG abnormalities were detected.     EKG:  The EKG demonstrates sinus rhythm.     IMPRESSION:  Severe obstructive sleep apnea with a positional variation.     RECOMMENDATIONS:    1.       It would be recommended to treat this sleep apnea with CPAP.  The patient should return to the sleep  lab for a formal CPAP titration.  The orders for this will be handled through my office.  There was a request for consultation upon completion of this study, and the patient will be contacted by my office to schedule this consultation in our sleep clinic.  2.       The patient should avoid driving or operating heavy machinery while sleepy.  3.       The patient should avoid alcohol or sedatives prior to sleep.     Dictated By Alonzo Hernandez M.D.  d:     03/01/2018 16:32:43       [place] Sleepiness Scale: (ESS) score on today's visit is   out of 24.     Score total of 1-6    Normal sleep   Score total of 7-8    Average sleepiness   Score total of 9-24    Abnormal (possibly pathologic) sleepiness       Impression:    Obstructive sleep apnea syndrome (OSAS): Attended sleep study performed on 2/25/2023 showed total of 223 apneas and hypopneas were detected, all of which were obstructive in nature.  This corresponds to an apnea and hypopnea index of 42.  There is a positional variation, with a supine AHI of 63 versus a lateral AHI of 14.  No significant REM predominance.  The oxygen divya is 80%.  The patient spent 96% of the record with a saturation of greater than 90% . Download data on 2/6/2024  for 90 day shows adequate AHI  and compliance   Daytime hypersomnolence/fatigue  Overnight   ;  Body mass index is 27.89 kg/m².  Anxiety   Depression  Hyperlipidemia                               Plan:    Continue  NEW auto-titrating CPAP at 6-16 cwp at bedtime as patient is using and benefiting from CPAP machine   Advised about weight loss   Advised against drowsy driving and to avoid alcoholic beverage and respiratory depressants as these may worsen sleep apnea      Follow up: 6  months with NP and 1 year with Dr. Reed    Thank you for allowing me to participate in your patient care.    YANIRA Reed MD, FACP, FCCP, FAA - Pulmonary/Critical care/Sleep Medicine  Please contact our office if you have any questions or concerns  at [phone]

## 2024-02-08 NOTE — PATIENT INSTRUCTIONS
Plan:    Continue  NEW auto-titrating CPAP at 6-16 cwp at bedtime as patient is using and benefiting from CPAP machine   Advised about weight loss   Advised against drowsy driving and to avoid alcoholic beverage and respiratory depressants as these may worsen sleep apnea      Follow up: 6  months with NP and 1 year with Dr. Derek Reed MD    Obstructive Sleep Apnea  Obstructive sleep apnea is a condition caused by air passages becoming narrowed or blocked during sleep. As a result, breathing stops for short periods. Your body wakes up enough for breathing to start again. But you don't remember it. The cycle of stopped breathing and brief awakenings can repeat dozens of times a night. This prevents the body from getting to the deeper stages of sleep that are needed for good rest.   Signs of sleep apnea include loud snoring, noisy breathing, and gasping sounds during sleep. People with sleep apnea often find they use the bathroom many times during the night. Daytime symptoms include waking up tired after a full night's sleep and waking up with headaches. They can also include feeling very sleepy or falling asleep during the day, and having problems with memory or concentration.   Risk factors for sleep apnea include:  Being overweight  Being assigned male at birth, or being in menopause  Smoking  Using alcohol or sedating medicines  Having enlarged structures in the nose or throat such as enlarged tonsils or adenoids, or extra tissue in the airway  Home care  Lifestyle changes that can help treat snoring and sleep apnea include:   If you're overweight, talk with your healthcare provider about a weight-loss plan for you.  Don't drink alcohol for 3 to 4 hours before bedtime.  Don't take sedating medicines. Ask your healthcare provider about the medicines you take.  If you smoke, talk to your provider about ways to quit. It's important to stay away from secondhand smoke. Don't use e-cigarettes because of their  harmful side effects.  Sleep on your side. This can help prevent gravity from pulling relaxed throat tissues into your breathing passages.  If you have allergies or sinus problems that block your nose, ask your provider for help.  Use positive airway pressure (PAP). Discuss with your provider the benefits of using PAP at home. And talk about the type of PAP that's best for you.  Follow-up care  Follow up with your healthcare provider, or as advised. A diagnosis of sleep apnea is made with a sleep study. Your provider can tell you more about this test.   When to get medical care  See your healthcare provider if you have daytime symptoms of sleep apnea. These include:   Waking up tired after a full night's sleep  Waking up with a headache  Feeling very sleepy or falling asleep during the day  Having problems with memory or concentration  Also talk with your provider if your partner tells you that you snore, gasp for air, or stop breathing while you sleep.   Seeing your provider is important because sleep apnea can make you more likely to have certain health problems. These include high blood pressure, heart attack, stroke, and sexual dysfunction. If you have sleep apnea, talk with your healthcare provider about the best treatments for you.   Jose Alejandro last reviewed this educational content on 5/1/2022  © 3001-6241 [employer]. All rights reserved. This information is not intended as a substitute for professional medical care. Always follow your healthcare professional's instructions.        Continuous Positive Air Pressure (CPAP)     A mask over the nose gently directs air into the throat to keep the airway open.     Continuous positive air pressure (CPAP) uses gentle air pressure to hold the airway open. CPAP is often the most effective treatment for sleep apnea. It works very well as a treatment for adults diagnosed with obstructive sleep apnea with a lot of sleepiness. But keep in mind that it can take  several adjustments before the setup is right for you.   How CPAP works  The CPAP machine  is a small portable pump that sits beside the bed. The pump sends air through a hose, which is held over your nose alone, or nose and mouth by a mask. Mild air pressure is gently pushed through your airway. The air pressure nudges sagging tissues aside. This widens the airway so you can breathe better. CPAP may be combined with other kinds of therapy for sleep apnea.   Types of air pressure treatments  There are different types of CPAP. Your doctor or CPAP technician will help you decide which type is best for you:   Basic CPAP keeps the pressure constant all night long.  A bilevel device (BiPAP) provides more pressure when you breathe in and less when you breathe out. A BiPAP machine also may be set to provide automatic breaths to maintain breathing if you stop breathing while sleeping.  An autoCPAP device automatically adjusts pressure throughout the night and in response to changes such as body position, sleep stage, and snoring.  [employer] last reviewed this educational content on 7/1/2019  © 4870-9735 [employer]. All rights reserved. This information is not intended as a substitute for professional medical care. Always follow your healthcare professional's instructions.

## 2024-04-18 ENCOUNTER — MED REC SCAN ONLY (OUTPATIENT)
Facility: CLINIC | Age: 49
End: 2024-04-18

## 2024-11-11 ENCOUNTER — OFFICE VISIT (OUTPATIENT)
Dept: FAMILY MEDICINE CLINIC | Facility: CLINIC | Age: 49
End: 2024-11-11
Payer: COMMERCIAL

## 2024-11-11 VITALS
HEART RATE: 64 BPM | HEIGHT: 71 IN | BODY MASS INDEX: 29.87 KG/M2 | DIASTOLIC BLOOD PRESSURE: 60 MMHG | TEMPERATURE: 98 F | WEIGHT: 213.38 LBS | SYSTOLIC BLOOD PRESSURE: 102 MMHG | RESPIRATION RATE: 16 BRPM

## 2024-11-11 DIAGNOSIS — Z23 NEED FOR VACCINATION: ICD-10-CM

## 2024-11-11 DIAGNOSIS — Z13.89 SCREENING FOR GENITOURINARY CONDITION: ICD-10-CM

## 2024-11-11 DIAGNOSIS — E78.01 FAMILIAL HYPERCHOLESTEROLEMIA: ICD-10-CM

## 2024-11-11 DIAGNOSIS — Z00.00 LABORATORY EXAMINATION ORDERED AS PART OF A ROUTINE GENERAL MEDICAL EXAMINATION: ICD-10-CM

## 2024-11-11 DIAGNOSIS — Z12.5 SCREENING FOR PROSTATE CANCER: ICD-10-CM

## 2024-11-11 DIAGNOSIS — E78.00 HYPERCHOLESTEREMIA: ICD-10-CM

## 2024-11-11 DIAGNOSIS — Z00.00 ANNUAL PHYSICAL EXAM: Primary | ICD-10-CM

## 2024-11-11 DIAGNOSIS — D22.9 MULTIPLE NEVI: ICD-10-CM

## 2024-11-11 DIAGNOSIS — Z02.89 ENCOUNTER FOR COMPLETION OF FORM WITH PATIENT: ICD-10-CM

## 2024-11-11 DIAGNOSIS — G47.33 OSA ON CPAP: ICD-10-CM

## 2024-11-11 RX ORDER — IMIQUIMOD 12.5 MG/.25G
1 CREAM TOPICAL 2 TIMES DAILY
COMMUNITY
Start: 2024-10-07

## 2024-11-11 NOTE — H&P
David Echeverria is a 49 year old male who presents for a complete physical exam.   HPI:   Patient denies any personal or family of testicular cancer. 1st cousin (paternal side) hx of colon cancer- age 50's. Paternal grandfather- hx of prostate cancer.  Colonoscopy- 2023, due 2033.  Urination changes- denies  ED symptoms- denies  Immunizations- Tdap- up to date. Would like Influenza vaccine today.  Seeing Dr. Paulino- psychiatry. Current regimen working well for him. No thoughts of harming self/others.  Seeing Dr. Reed- + DENISE, CPAP.     + family history of hyercholesterolemia. [facility] 2023- score 0. He did take Crestor x about 2 months but then forgot to have repeat labs and stopped medication. He reports he did not have any side effects that he can recall while taking this medication. Pt is not fasting today.  Lipids 2023- total cholesterol 235, LDL-173    Has form needs completed for [employer].    Wt Readings from Last 6 Encounters:   11/11/24 213 lb 6.4 oz (96.8 kg)   02/08/24 200 lb (90.7 kg)   10/09/23 207 lb (93.9 kg)   09/25/23 205 lb (93 kg)   08/28/23 207 lb 12.8 oz (94.3 kg)   01/12/22 196 lb (88.9 kg)     Body mass index is 29.76 kg/m².     Results for orders placed or performed in visit on 08/28/23   Comp Metabolic Panel (14)    Collection Time: 08/28/23 10:05 AM   Result Value Ref Range    Glucose 107 (H) 70 - 99 mg/dL    Sodium 139 136 - 145 mmol/L    Potassium 4.2 3.5 - 5.1 mmol/L    Chloride 106 98 - 112 mmol/L    CO2 28.0 21.0 - 32.0 mmol/L    Anion Gap 5 0 - 18 mmol/L    BUN 20 (H) 7 - 18 mg/dL    Creatinine 1.25 0.70 - 1.30 mg/dL    Calcium, Total 9.0 8.5 - 10.1 mg/dL    Calculated Osmolality 291 275 - 295 mOsm/kg    eGFR-Cr 71 >=60 mL/min/1.73m2    AST 13 (L) 15 - 37 U/L    ALT 27 16 - 61 U/L    Alkaline Phosphatase 54 45 - 117 U/L    Bilirubin, Total 0.6 0.1 - 2.0 mg/dL    Total Protein 7.3 6.4 - 8.2 g/dL    Albumin 4.0 3.4 - 5.0 g/dL    Globulin  3.3 2.8 - 4.4 g/dL    A/G Ratio 1.2 1.0 - 2.0    Patient  Fasting for CMP? Yes    Lipid Panel    Collection Time: 08/28/23 10:05 AM   Result Value Ref Range    Cholesterol, Total 235 (H) <200 mg/dL    HDL Cholesterol 44 40 - 59 mg/dL    Triglycerides 99 30 - 149 mg/dL    LDL Cholesterol 173 (H) <100 mg/dL    VLDL 20 0 - 30 mg/dL    Non HDL Chol 191 (H) <130 mg/dL    Patient Fasting for Lipid? Yes    Urinalysis with Culture Reflex    Collection Time: 08/28/23 10:05 AM    Specimen: Urine, clean catch   Result Value Ref Range    Urine Color Light-Yellow Yellow    Clarity Urine Clear Clear    Spec Gravity 1.014 1.005 - 1.030    Glucose Urine Normal Normal mg/dL    Bilirubin Urine Negative Negative    Ketones Urine Negative Negative mg/dL    Blood Urine Negative Negative    pH Urine 7.0 5.0 - 8.0    Protein Urine Negative Negative mg/dL    Urobilinogen Urine Normal Normal mg/dL    Nitrite Urine Negative Negative    Leukocyte Esterase Urine Negative Negative    Microscopic Microscopic not indicated    TSH W Reflex To Free T4    Collection Time: 08/28/23 10:05 AM   Result Value Ref Range    TSH 1.280 0.358 - 3.740 mIU/mL   PSA, Total (Screening) [E]    Collection Time: 08/28/23 10:05 AM   Result Value Ref Range    Prostate Specific Antigen Screen 0.89 <=4.00 ng/mL   CBC W/ DIFFERENTIAL    Collection Time: 08/28/23 10:05 AM   Result Value Ref Range    WBC 4.6 4.0 - 11.0 x10(3) uL    RBC 4.94 4.30 - 5.70 x10(6)uL    HGB 14.2 13.0 - 17.5 g/dL    HCT 42.7 39.0 - 53.0 %    .0 150.0 - 450.0 10(3)uL    MCV 86.4 80.0 - 100.0 fL    MCH 28.7 26.0 - 34.0 pg    MCHC 33.3 31.0 - 37.0 g/dL    RDW 13.6 %    Neutrophil Absolute Prelim 2.56 1.50 - 7.70 x10 (3) uL    Neutrophil Absolute 2.56 1.50 - 7.70 x10(3) uL    Lymphocyte Absolute 1.54 1.00 - 4.00 x10(3) uL    Monocyte Absolute 0.32 0.10 - 1.00 x10(3) uL    Eosinophil Absolute 0.11 0.00 - 0.70 x10(3) uL    Basophil Absolute 0.08 0.00 - 0.20 x10(3) uL    Immature Granulocyte Absolute 0.00 0.00 - 1.00 x10(3) uL    Neutrophil % 55.6 %     Lymphocyte % 33.4 %    Monocyte % 6.9 %    Eosinophil % 2.4 %    Basophil % 1.7 %    Immature Granulocyte % 0.0 %       Current Outpatient Medications   Medication Sig Dispense Refill    Imiquimod 5 % External Cream Apply 1 Application topically 2 (two) times daily.      hydrOXYzine 50 MG Oral Tab       PARoxetine HCl 40 MG Oral Tab Take 1 tablet (40 mg total) by mouth every morning. 30 tablet 0      Past Medical History:    Anxiety    Depression    Flatulence/gas pain/belching    Food intolerance    Hemorrhoids    Hyperlipidemia    Have been on Lipitor or Cressor    DENISE (obstructive sleep apnea)    AHI 42 Supine AHI 63 non-supine AHI 14 Sao2 Attila 80%     Sleep apnea    Diagnosed this year    Stress      Past Surgical History:   Procedure Laterality Date    Hernia surgery      age 20    Tonsillectomy        Family History   Problem Relation Age of Onset    Ulcerative Colitis Mother     High Blood Pressure Father          BRAIN HEMORAGE AGE 72     High Cholesterol Father     Cancer Maternal Grandmother     Other (LUPUS) Maternal Grandmother              Diabetes Maternal Grandfather             Cancer Paternal Grandmother              Suicide History Cousin       Social History:  Social History     Socioeconomic History    Marital status:    Tobacco Use    Smoking status: Former     Current packs/day: 0.00     Average packs/day: 1 pack/day for 15.7 years (15.7 ttl pk-yrs)     Types: Cigarettes     Start date: 1993     Quit date: 2009     Years since quitting: 15.8     Passive exposure: Past    Smokeless tobacco: Former     Types: Chew    Tobacco comments:     Takes nicotine mints   Vaping Use    Vaping status: Never Used   Substance and Sexual Activity    Alcohol use: Not Currently    Drug use: No    Sexual activity: Yes     Partners: Female   Other Topics Concern    Caffeine Concern Yes     Comment: half a pot of coffee every morning    Exercise Yes     Comment: walking       Occ: . : Y. Children: Y-4 (19, 17, 8-twins).   Exercise: minimal, walking, hiking .  Diet:  monitors diet     REVIEW OF SYSTEMS:   GENERAL: feels well overall, denies fever or chills, denies change in weight  SKIN: denies any unusual skin lesions  EYES: denies changes in vision  HENT: denies upper respiratory symptoms  LUNGS: denies JESSE, wheezing, cough, orthopnea and PND  CARDIOVASCULAR: denies CP, palpitations, rapid or slow heart rate. Denies BAILEY/lower extremity swelling  GI: denies abdominal pain,denies heartburn, denies n/v/c/d/change in stools/blood in stool/black stool/change in appetite  : denies nocturia or changes in urinary stream, denies scrotal mass/abnormal discharge from urethra  MUSCULOSKELETAL: denies joint or muscle aches or pains  NEURO: denies headaches/dizziness  PSYCH: + mood disorder  HEMATOLOGIC: denies easy bruising/bleeding/anemia/blood clot disorders  ENDOCRINE: denies weight gain/weight loss/enlargement of neck glands/polyuria/polydypsia  ALL/ASTHMA: denies allergic rhinitis/asthma  EXAM:   /60   Pulse 64   Temp 97.7 °F (36.5 °C) (Temporal)   Resp 16   Ht 5' 11\" (1.803 m)   Wt 213 lb 6.4 oz (96.8 kg)   BMI 29.76 kg/m²   Body mass index is 29.76 kg/m².   GENERAL: NAD, pleasant, well developed, normal voice  SKIN: no rashes  HENT: NCAT, EACs clear b/l, TMs normal b/l, nasal turbinatess normal b/l, oropharynx with mmm/clear/normal, gingiva normal, lips normal  EYES: PERRLA, EOMI, conjunctiva non-injected and non-icteric  NECK: supple, no adenopathy/thyromegaly/thyroid nodules/masses  LUNGS: CTA A/P, no wheezes/ronchi/rales/crackles, normal air excursion  CARDIOVASCULAR: RRR, no murmur, no lower extremity edema, pedal and femoral pulses 2+ and symmetric b/l  GI: normoactive BS, non-distended, non-tender to palpation, no HSM/masses/pulsations  : two descended testes, no scrotal masses, no hernia, no penile lesions  RECTAL: external anal  sphincter appears normal, normal rectal tone, no masses,   Prostate: smooth, normal contours, NT, normal size.     Hemoccult- neg Mya L MA present during exam  MUSCULOSKELETAL: Back with normal AROM, no joint swelling, extremities x 4 with normal strength 5/5 and symmetric and with normal AROM/PROM.   EXTREMITIES: no cyanosis, clubbing or edema  NEURO: A&O x3, CN II-XII grossly intact. Reflexes: biceps and patellar 2+ b/l and symmetric. Gait is normal.  PSYCH: normal affect, no apparent thought disorder, average judgement and insight    Immunization History   Administered Date(s) Administered    Covid-19 Vaccine Pfizer 30 mcg/0.3 ml 04/09/2021, 04/26/2021, 12/16/2021    FLULAVAL 6 months & older 0.5 ml Prefilled syringe (23352) 10/27/2017, 11/26/2018, 01/12/2022    FLUZONE 6 months and older PFS 0.5 ml (47798) 10/27/2017, 11/26/2018    Flublok Quad Influenza Vaccine (85535) 10/25/2020    TDAP 08/28/2023   Pended Date(s) Pended    Influenza Vaccine, trivalent (IIV3), PF 0.5mL (70599) 11/11/2024       ASSESSMENT AND PLAN:   David Echeverria is a 49 year old male who presents for a complete physical exam.   Recommend healthy diet including green leafy vegetables, fresh fruits and lean meats.  Aerobic exercise 30 minutes five days a week for cardiovascular fitness and 45-60 minutes 6-7 days a week for weight loss. Advised testicular self exam once a month.  There are no Patient Instructions on file for this visit.      Procedures    CBC With Differential With Platelet    Comp Metabolic Panel (14)    Lipid Panel    Urinalysis with Culture Reflex    TSH W Reflex To Free T4    PSA Total, Screen       1. Annual physical exam    2. Laboratory examination ordered as part of a routine general medical examination  - CBC With Differential With Platelet; Future  - Comp Metabolic Panel (14); Future  - Lipid Panel; Future  - TSH W Reflex To Free T4; Future    3. Screening for genitourinary condition  - Urinalysis with Culture  Reflex; Future    4. Screening for prostate cancer  - PSA Total, Screen; Future    5. Need for vaccination  - Immunization Admin Counseling, 1st Component, 18 years and older  - Fluzone trivalent vaccine, PF 0.5mL, 6mo+ (14073)    6. BMI 29.0-29.9,adult    7. DENISE on CPAP    8. Hypercholesteremia  - Comp Metabolic Panel (14); Future  - Lipid Panel; Future    9. Familial hypercholesterolemia    10. Encounter for completion of form with patient    11. Multiple nevi  - Derm Referral - In Network      Derm referral placed.  Influenza vaccine today.  Continue follow up with specialists.  Focus on healthy diet--low fat, routine exercise, weight loss. Body mass index is 29.76 kg/m².  Await lipids--discussed despite UFHS score of 0, previous + and FH indication for statin therapy. Await results for further recommendations       The patient verbalizes understanding of these recommendations and agrees to the plan.  The patient is asked to return for CPX in 1 year and as needed. Also, for nurse visit in the Fall for influenza immunization.

## 2024-11-12 ENCOUNTER — MED REC SCAN ONLY (OUTPATIENT)
Facility: CLINIC | Age: 49
End: 2024-11-12

## 2024-11-20 ENCOUNTER — LAB ENCOUNTER (OUTPATIENT)
Dept: LAB | Age: 49
End: 2024-11-20
Attending: NURSE PRACTITIONER
Payer: COMMERCIAL

## 2024-11-20 DIAGNOSIS — Z12.5 SCREENING FOR PROSTATE CANCER: ICD-10-CM

## 2024-11-20 DIAGNOSIS — Z00.00 LABORATORY EXAMINATION ORDERED AS PART OF A ROUTINE GENERAL MEDICAL EXAMINATION: ICD-10-CM

## 2024-11-20 DIAGNOSIS — Z13.89 SCREENING FOR GENITOURINARY CONDITION: ICD-10-CM

## 2024-11-20 DIAGNOSIS — E78.00 HYPERCHOLESTEREMIA: ICD-10-CM

## 2024-11-20 LAB
ALBUMIN SERPL-MCNC: 4.7 G/DL (ref 3.2–4.8)
ALBUMIN/GLOB SERPL: 2.1 {RATIO} (ref 1–2)
ALP LIVER SERPL-CCNC: 58 U/L
ALT SERPL-CCNC: 27 U/L
ANION GAP SERPL CALC-SCNC: 4 MMOL/L (ref 0–18)
AST SERPL-CCNC: 17 U/L (ref ?–34)
BASOPHILS # BLD AUTO: 0.08 X10(3) UL (ref 0–0.2)
BASOPHILS NFR BLD AUTO: 1.3 %
BILIRUB SERPL-MCNC: 0.5 MG/DL (ref 0.3–1.2)
BILIRUB UR QL STRIP.AUTO: NEGATIVE
BUN BLD-MCNC: 19 MG/DL (ref 9–23)
CALCIUM BLD-MCNC: 9.6 MG/DL (ref 8.7–10.4)
CHLORIDE SERPL-SCNC: 107 MMOL/L (ref 98–112)
CHOLEST SERPL-MCNC: 259 MG/DL (ref ?–200)
CLARITY UR REFRACT.AUTO: CLEAR
CO2 SERPL-SCNC: 29 MMOL/L (ref 21–32)
COMPLEXED PSA SERPL-MCNC: 0.72 NG/ML (ref ?–4)
CREAT BLD-MCNC: 1.22 MG/DL
EGFRCR SERPLBLD CKD-EPI 2021: 73 ML/MIN/1.73M2 (ref 60–?)
EOSINOPHIL # BLD AUTO: 0.32 X10(3) UL (ref 0–0.7)
EOSINOPHIL NFR BLD AUTO: 5.3 %
ERYTHROCYTE [DISTWIDTH] IN BLOOD BY AUTOMATED COUNT: 13.7 %
FASTING PATIENT LIPID ANSWER: YES
FASTING STATUS PATIENT QL REPORTED: YES
GLOBULIN PLAS-MCNC: 2.2 G/DL (ref 2–3.5)
GLUCOSE BLD-MCNC: 93 MG/DL (ref 70–99)
GLUCOSE UR STRIP.AUTO-MCNC: NORMAL MG/DL
HCT VFR BLD AUTO: 43.1 %
HDLC SERPL-MCNC: 46 MG/DL (ref 40–59)
HGB BLD-MCNC: 14.6 G/DL
IMM GRANULOCYTES # BLD AUTO: 0.02 X10(3) UL (ref 0–1)
IMM GRANULOCYTES NFR BLD: 0.3 %
KETONES UR STRIP.AUTO-MCNC: NEGATIVE MG/DL
LDLC SERPL CALC-MCNC: 171 MG/DL (ref ?–100)
LEUKOCYTE ESTERASE UR QL STRIP.AUTO: NEGATIVE
LYMPHOCYTES # BLD AUTO: 2.27 X10(3) UL (ref 1–4)
LYMPHOCYTES NFR BLD AUTO: 37.3 %
MCH RBC QN AUTO: 29.3 PG (ref 26–34)
MCHC RBC AUTO-ENTMCNC: 33.9 G/DL (ref 31–37)
MCV RBC AUTO: 86.4 FL
MONOCYTES # BLD AUTO: 0.54 X10(3) UL (ref 0.1–1)
MONOCYTES NFR BLD AUTO: 8.9 %
NEUTROPHILS # BLD AUTO: 2.85 X10 (3) UL (ref 1.5–7.7)
NEUTROPHILS # BLD AUTO: 2.85 X10(3) UL (ref 1.5–7.7)
NEUTROPHILS NFR BLD AUTO: 46.9 %
NITRITE UR QL STRIP.AUTO: NEGATIVE
NONHDLC SERPL-MCNC: 213 MG/DL (ref ?–130)
OSMOLALITY SERPL CALC.SUM OF ELEC: 292 MOSM/KG (ref 275–295)
PH UR STRIP.AUTO: 6 [PH] (ref 5–8)
PLATELET # BLD AUTO: 228 10(3)UL (ref 150–450)
POTASSIUM SERPL-SCNC: 4.4 MMOL/L (ref 3.5–5.1)
PROT SERPL-MCNC: 6.9 G/DL (ref 5.7–8.2)
PROT UR STRIP.AUTO-MCNC: NEGATIVE MG/DL
RBC # BLD AUTO: 4.99 X10(6)UL
SODIUM SERPL-SCNC: 140 MMOL/L (ref 136–145)
SP GR UR STRIP.AUTO: 1.02 (ref 1–1.03)
TRIGL SERPL-MCNC: 222 MG/DL (ref 30–149)
TSI SER-ACNC: 2.72 UIU/ML (ref 0.55–4.78)
UROBILINOGEN UR STRIP.AUTO-MCNC: NORMAL MG/DL
VLDLC SERPL CALC-MCNC: 45 MG/DL (ref 0–30)
WBC # BLD AUTO: 6.1 X10(3) UL (ref 4–11)

## 2024-11-20 PROCEDURE — 80050 GENERAL HEALTH PANEL: CPT | Performed by: NURSE PRACTITIONER

## 2024-11-20 PROCEDURE — 80061 LIPID PANEL: CPT | Performed by: NURSE PRACTITIONER

## 2024-11-20 PROCEDURE — 84153 ASSAY OF PSA TOTAL: CPT | Performed by: NURSE PRACTITIONER

## 2024-11-20 PROCEDURE — 81001 URINALYSIS AUTO W/SCOPE: CPT | Performed by: NURSE PRACTITIONER

## 2024-11-21 ENCOUNTER — TELEPHONE (OUTPATIENT)
Dept: FAMILY MEDICINE CLINIC | Facility: CLINIC | Age: 49
End: 2024-11-21

## 2024-11-21 NOTE — TELEPHONE ENCOUNTER
[employer] form completed and faxed to number associated. [employer] message sent to patient.

## 2024-11-22 RX ORDER — ROSUVASTATIN CALCIUM 10 MG/1
10 TABLET, COATED ORAL NIGHTLY
Qty: 90 TABLET | Refills: 0 | Status: SHIPPED | OUTPATIENT
Start: 2024-11-22

## 2024-12-13 ENCOUNTER — MED REC SCAN ONLY (OUTPATIENT)
Facility: CLINIC | Age: 49
End: 2024-12-13

## 2025-02-10 NOTE — PROGRESS NOTES
David Echeverria  2024 - 2025  Patient ID: [id number]  : 1975  Age: 49 years  [place]  [address]  [facility], [address]  Phone: [phone]  Fax: [phone]  Email: [email or web address]  Compliance Report  Compliance  [employer] Standard  Usage 2024 - 2025  Usage days 82/90 days (91%)  >= 4 hours 73 days (81%)  < 4 hours 9 days (10%)  Usage hours 613 hours 5 minutes  Average usage (total days) 6 hours 49 minutes  Average usage (days used) 7 hours 29 minutes  Median usage (days used) 7 hours 47 minutes  Total used hours (value since last reset - 2025) 2,626 hours  [employer] 11 AutoSet  Serial number [id number]  Mode AutoSet  Min Pressure 6 cmH2O  Max Pressure 16 cmH2O  EPR Fulltime  EPR level 3  Response Soft  Therapy  Pressure - cmH2O Median: 8.6 95th percentile: 11.2 Maximum: 12.7  Leaks - L/min Median: 0.3 95th percentile: 8.5 Maximum: 29.7  Events per hour AI: 3.7 HI: 1.4 AHI: 5.1  Apnea Index Central: 1.6 Obstructive: 1.9 Unknown: 0.1  RERA Index 0.5  Cheyne-Price respiration (average duration per night) 0 minutes (0%)

## 2025-02-11 ENCOUNTER — TELEMEDICINE (OUTPATIENT)
Facility: CLINIC | Age: 50
End: 2025-02-11
Payer: COMMERCIAL

## 2025-02-11 DIAGNOSIS — G47.19 DAYTIME HYPERSOMNOLENCE: ICD-10-CM

## 2025-02-11 DIAGNOSIS — G47.33 OSA ON CPAP: Primary | ICD-10-CM

## 2025-02-11 PROCEDURE — 98006 SYNCH AUDIO-VIDEO EST MOD 30: CPT | Performed by: INTERNAL MEDICINE

## 2025-02-11 NOTE — PROGRESS NOTES
This visit is conducted using Telemedicine with live, interactive video and audio.    Patient has been referred to the [facility] website at [email or web address] to review the yearly Consent to Treat document.    Patient understands and accepts financial responsibility for any deductible, co-insurance and/or co-pays associated with this service.      Pulmonary/Critical Care/Sleep Medicine   Progress Note     PCP: Rima Gallo MD   Phone: [phone]   Fax: [phone]         HPI  I had the pleasure of seeing David Echeverria who is a pleasant 49 year old male who presents for follow up of DENISE after visit on 2/8/2024    The patient reports using auto CPAP  at 6-16 cmH2O regularly throughout the night for about 7 hours and states that the  PAP machine is quiet and has a heated humidifier.  The patient states that he is doing really well.     The patient denies kicking legs at night. Denies teeth grinding.         He drinks less than 1 pot  caffeine coffee daily       He has pets 2 dogs           Hx of tobacco use: He  reports that he quit smoking about 16 years ago. His smoking use included cigarettes. He started smoking about 31 years ago. He has a 15.7 pack-year smoking history. He has been exposed to tobacco smoke. He has quit using smokeless tobacco.  His smokeless tobacco use included chew.    Past Medical History:    Anxiety    Depression    Flatulence/gas pain/belching    Food intolerance    Hemorrhoids    Hyperlipidemia    Have been on Lipitor or Cressor    DENISE (obstructive sleep apnea)    AHI 42 Supine AHI 63 non-supine AHI 14 Sao2 Attila 80%     Sleep apnea    Diagnosed this year    Stress      Past Surgical History:   Procedure Laterality Date    Hernia surgery      age 20    Tonsillectomy       Allergies   Allergen Reactions    Penicillins HIVES    Honey Bee Venom [Bee Venom]      Swelling      Current Outpatient Medications   Medication Sig Dispense Refill    rosuvastatin (CRESTOR) 10 MG Oral Tab  Take 1 tablet (10 mg total) by mouth nightly. 90 tablet 0    Imiquimod 5 % External Cream Apply 1 Application topically 2 (two) times daily.      hydrOXYzine 50 MG Oral Tab       PARoxetine HCl 40 MG Oral Tab Take 1 tablet (40 mg total) by mouth every morning. 30 tablet 0      Social History     Socioeconomic History    Marital status:    Tobacco Use    Smoking status: Former     Current packs/day: 0.00     Average packs/day: 1 pack/day for 15.7 years (15.7 ttl pk-yrs)     Types: Cigarettes     Start date: 1993     Quit date: 2009     Years since quittin.1     Passive exposure: Past    Smokeless tobacco: Former     Types: Chew    Tobacco comments:     Takes nicotine mints   Vaping Use    Vaping status: Never Used   Substance and Sexual Activity    Alcohol use: Not Currently    Drug use: No    Sexual activity: Yes     Partners: Female   Other Topics Concern    Caffeine Concern Yes     Comment: half a pot of coffee every morning    Exercise Yes     Comment: walking      Immunization History   Administered Date(s) Administered    Covid-19 Vaccine Pfizer 30 mcg/0.3 ml 2021, 2021, 2021    FLULAVAL 6 months & older 0.5 ml Prefilled syringe (88430) 10/27/2017, 2018, 2022    FLUZONE 6 months and older PFS 0.5 ml (62973) 10/27/2017, 2018    Flublok Quad Influenza Vaccine (02403) 10/25/2020    Influenza Vaccine, trivalent (IIV3), PF 0.5mL (02112) 2024    TDAP 2023      Family History   Problem Relation Age of Onset    Ulcerative Colitis Mother     High Blood Pressure Father          BRAIN HEMORAGE AGE 72     High Cholesterol Father     Cancer Maternal Grandmother     Other (LUPUS) Maternal Grandmother              Diabetes Maternal Grandfather             Cancer Paternal Grandmother              Suicide History Cousin         Review of Systems   Constitutional:  Negative for fatigue, fever and unexpected weight change.   HENT:   Negative for congestion, mouth sores, nosebleeds, postnasal drip, rhinorrhea, sore throat and trouble swallowing.    Eyes:  Negative for visual disturbance.   Respiratory:  Negative for apnea, cough, choking, chest tightness, shortness of breath and wheezing.    Cardiovascular:  Negative for chest pain, palpitations and leg swelling.   Gastrointestinal:  Negative for abdominal pain, constipation, diarrhea, nausea and vomiting.   Genitourinary:  Negative for difficulty urinating.   Musculoskeletal:  Negative for arthralgias, back pain, gait problem and myalgias.   Neurological:  Negative for dizziness, weakness and headaches.   Psychiatric/Behavioral:  Negative for sleep disturbance.        There were no vitals filed for this visit.        Ht Readings from Last 1 Encounters:   11/11/24 5' 11\" (1.803 m)     Wt Readings from Last 1 Encounters:   11/11/24 213 lb 6.4 oz (96.8 kg)     There is no height or weight on file to calculate BMI.   Constitutional: Appearance: Normal appearance form visual view during virtual video appointment.     HENT: Head: Normal appearance form visual view during virtual video appointment, Face: Normal appearance form visual view during virtual video appointment,     Eyes: Normal appearance form visual view during virtual video appointment  Neck: Normal appearance form visual view during virtual  video appointment.   Pulmonary:Effort: Pulmonary effort seems normal from visual view during virtual video appointment.     Musculoskeletal: Normal range of motion. General: No swelling, deformity or signs of injury from visual view during virtual video appointment.     Skin: General: Skin is Normal appearance form visual view during virtual video appointment.    Neurological: Mental Status: She is alert and oriented to person, place, and time form visual viewing during virtual doxy video appointment, Coordination: normal form visual viewing during virtual video appointment.    Psychiatric: Mood and  Affect: Mood seems normal form visual viewing during virtual doxy video appointment, Behavior, Thought Content, and Judgment all seem normal form visual viewing during virtual video appointment     Last visit   Physical Exam  Constitutional:       General: He is not in acute distress.     Appearance: Normal appearance. He is not ill-appearing or diaphoretic.   HENT:      Head: Normocephalic and atraumatic.      Nose: Nose normal. No congestion or rhinorrhea.      Comments: Narrow nares bilaterally      Mouth/Throat:      Mouth: Mucous membranes are moist.      Pharynx: Oropharynx is clear. No oropharyngeal exudate or posterior oropharyngeal erythema.      Comments: Mallampati class III palate   Eyes:      Extraocular Movements: Extraocular movements intact.      Pupils: Pupils are equal, round, and reactive to light.   Cardiovascular:      Rate and Rhythm: Normal rate.      Pulses: Normal pulses.      Heart sounds: Normal heart sounds. No murmur heard.  Pulmonary:      Effort: Pulmonary effort is normal. No respiratory distress.      Breath sounds: Normal breath sounds. No wheezing or rhonchi.   Chest:      Chest wall: No tenderness.   Abdominal:      General: Abdomen is flat. Bowel sounds are normal.      Palpations: Abdomen is soft.   Musculoskeletal:         General: Normal range of motion.   Skin:     General: Skin is warm.   Neurological:      General: No focal deficit present.      Mental Status: He is alert and oriented to person, place, and time.   Psychiatric:         Mood and Affect: Mood normal.         Behavior: Behavior normal.         Thought Content: Thought content normal.         Judgment: Judgment normal.            Labs:  Last BMP  Lab Results   Component Value Date    GLU 93 11/20/2024    BUN 19 11/20/2024    CREATSERUM 1.22 11/20/2024    BUNCREA 11.4 11/26/2018    ANIONGAP 4 11/20/2024    GFRAA 100 11/26/2018    GFRNAA 87 11/26/2018    CA 9.6 11/20/2024     11/20/2024    K 4.4 11/20/2024      11/20/2024    CO2 29.0 11/20/2024    OSMOCALC 292 11/20/2024      Last CBC  Lab Results   Component Value Date    WBC 6.1 11/20/2024    RBC 4.99 11/20/2024    HGB 14.6 11/20/2024    HCT 43.1 11/20/2024    MCV 86.4 11/20/2024    MCH 29.3 11/20/2024    MCHC 33.9 11/20/2024    RDW 13.7 11/20/2024    .0 11/20/2024      Last CMP  Lab Results   Component Value Date    GLU 93 11/20/2024    BUN 19 11/20/2024    BUNCREA 11.4 11/26/2018    CREATSERUM 1.22 11/20/2024    ANIONGAP 4 11/20/2024    GFR 70 07/12/2017    GFRNAA 87 11/26/2018    GFRAA 100 11/26/2018    CA 9.6 11/20/2024    OSMOCALC 292 11/20/2024    ALKPHO 58 11/20/2024    AST 17 11/20/2024    ALT 27 11/20/2024    BILT 0.5 11/20/2024    TP 6.9 11/20/2024    ALB 4.7 11/20/2024    GLOBULIN 2.2 11/20/2024     11/20/2024    K 4.4 11/20/2024     11/20/2024    CO2 29.0 11/20/2024      Last Thyroid Function  Lab Results   Component Value Date    T4F 1.1 07/12/2017    TSH 2.720 11/20/2024        Imaging:  [facility]       Accredited by the American Academy of Sleep Medicine (AASM)     PATIENT'S NAME:        POOJA VALENCIA  ATTENDING PHYSICIAN:   Alonzo Hernandez M.D.  REFERRING PHYSICIAN:   Nahed Qiu D.O.  PATIENT ACCOUNT #:     [id number]        LOCATION:       [facility]  MEDICAL RECORD #:      [id number]        YOB: 1975  DATE OF STUDY:         02/25/2018     SLEEP STUDY REPORT     STUDY TYPE:  Diagnostic polysomnography.      CLINICAL HISTORY:  The patient is a 42-year-old with a history of snoring, witnessed apneas, and daytime sleepiness.  He has tried mandibular advancement devices in the past without success.     DIAGNOSTIC RECORDING PARAMETERS:  The patient underwent a formal polysomnographic evaluation at the [facility].  The study was acquired in compliance with the AASM Manual for the Scoring of Sleep and Associated Events.  The following parameters were monitored:  EOG, EEG, ECG,  chin EMG, left and right tibialis EMG, snore microphone, an oronasal thermal sensor, nasal pressure transducer, respiratory inductance plethysmography, and oxygen saturation via continuous pulse oximetry.  In accordance with AASM recommendations, hypopnea events are scored based on an oxygen saturation more than or equal to 4 percent.  Body position is documented via technician notes every 15 minutes.         SLEEP ARCHITECTURE:  Total recording time 443 minutes, total sleep time 316 minutes, for a sleep efficiency of 71%.  Sleep stage breakdown as follows:  Stage 1 at 65%, slow-wave sleep at 8%, REM sleep at 7%.  Sleep onset latency is 23 minutes, REM onset latency 422 minutes, arousal index is 87.     RESPIRATORY MEASURES:  A total of 223 apneas and hypopneas were detected, all of which were obstructive in nature.  This corresponds to an apnea and hypopnea index of 42.  There is a positional variation, with a supine AHI of 63 versus a lateral AHI of 14.  No significant REM predominance.  The oxygen divya is 80%.  The patient spent 96% of the record with a saturation of greater than 90%.     PERIODIC LIMB MOVEMENTS:  PLM index is 7.  PLM arousal index is 4.     EEG:  With the limited recording montage, no EEG abnormalities were detected.     EKG:  The EKG demonstrates sinus rhythm.     IMPRESSION:  Severe obstructive sleep apnea with a positional variation.     RECOMMENDATIONS:    1.       It would be recommended to treat this sleep apnea with CPAP.  The patient should return to the sleep lab for a formal CPAP titration.  The orders for this will be handled through my office.  There was a request for consultation upon completion of this study, and the patient will be contacted by my office to schedule this consultation in our sleep clinic.  2.       The patient should avoid driving or operating heavy machinery while sleepy.  3.       The patient should avoid alcohol or sedatives prior to sleep.     Dictated By Alonzo  AMILCAR Hernandez  d:     03/01/2018 16:32:43       [place] Sleepiness Scale: (ESS) score on today's visit is   out of 24.     Score total of 1-6    Normal sleep   Score total of 7-8    Average sleepiness   Score total of 9-24    Abnormal (possibly pathologic) sleepiness       Impression:    Obstructive sleep apnea syndrome (OSAS): Attended sleep study performed on 2/25/2023 showed total of 223 apneas and hypopneas were detected, all of which were obstructive in nature.  This corresponds to an apnea and hypopnea index of 42.  There is a positional variation, with a supine AHI of 63 versus a lateral AHI of 14.  No significant REM predominance.  The oxygen divya is 80%.  The patient spent 96% of the record with a saturation of greater than 90% . Download data on 2/9/2025 for 90 day shows adequate AHI  and compliance   Daytime hypersomnolence/fatigue  Overnight   ;  There is no height or weight on file to calculate BMI.  Anxiety   Depression  Hyperlipidemia                               Plan:    Continue  auto-titrating CPAP at 6-16 cwp at bedtime as patient is using and benefiting from CPAP machine   Advised about weight loss   Advised against drowsy driving and to avoid alcoholic beverage and respiratory depressants as these may worsen sleep apnea      Follow up:  1 year with Dr. Reed    Thank you for allowing me to participate in your patient care.    YANIRA Reed MD, FACP, FCCP, FAA - Pulmonary/Critical care/Sleep Medicine  Please contact our office if you have any questions or concerns at [phone]

## 2025-02-12 NOTE — PATIENT INSTRUCTIONS
Plan:    Continue  auto-titrating CPAP at 6-16 cwp at bedtime as patient is using and benefiting from CPAP machine   Advised about weight loss   Advised against drowsy driving and to avoid alcoholic beverage and respiratory depressants as these may worsen sleep apnea      Follow up:  1 year with Dr. Derek Reed MD    Obstructive Sleep Apnea  Obstructive sleep apnea is a condition caused by air passages becoming narrowed or blocked during sleep. As a result, breathing stops for short periods. Your body wakes up enough for breathing to start again. But you don't remember it. The cycle of stopped breathing and brief awakenings can repeat dozens of times a night. This prevents the body from getting to the deeper stages of sleep that are needed for good rest.   Signs of sleep apnea include loud snoring, noisy breathing, and gasping sounds during sleep. People with sleep apnea often find they use the bathroom many times during the night. Daytime symptoms include waking up tired after a full night's sleep and waking up with headaches. They can also include feeling very sleepy or falling asleep during the day, and having problems with memory or concentration.   Risk factors for sleep apnea include:  Being overweight  Being assigned male at birth, or being in menopause  Smoking  Using alcohol or sedating medicines  Having enlarged structures in the nose or throat such as enlarged tonsils or adenoids, or extra tissue in the airway  Home care  Lifestyle changes that can help treat snoring and sleep apnea include:   If you're overweight, talk with your healthcare provider about a weight-loss plan for you.  Don't drink alcohol for 3 to 4 hours before bedtime.  Don't take sedating medicines. Ask your healthcare provider about the medicines you take.  If you smoke, talk to your provider about ways to quit. It's important to stay away from secondhand smoke. Don't use e-cigarettes because of  their harmful side effects.  Sleep on your side. This can help prevent gravity from pulling relaxed throat tissues into your breathing passages.  If you have allergies or sinus problems that block your nose, ask your provider for help.  Use positive airway pressure (PAP). Discuss with your provider the benefits of using PAP at home. And talk about the type of PAP that's best for you.  Follow-up care  Follow up with your healthcare provider, or as advised. A diagnosis of sleep apnea is made with a sleep study. Your provider can tell you more about this test.   When to get medical care  See your healthcare provider if you have daytime symptoms of sleep apnea. These include:   Waking up tired after a full night's sleep  Waking up with a headache  Feeling very sleepy or falling asleep during the day  Having problems with memory or concentration  Also talk with your provider if your partner tells you that you snore, gasp for air, or stop breathing while you sleep.   Seeing your provider is important because sleep apnea can make you more likely to have certain health problems. These include high blood pressure, heart attack, stroke, and sexual dysfunction. If you have sleep apnea, talk with your healthcare provider about the best treatments for you.   [employer] last reviewed this educational content on 5/1/2022  © 7513-2721 [employer]. All rights reserved. This information is not intended as a substitute for professional medical care. Always follow your healthcare professional's instructions.        Continuous Positive Air Pressure (CPAP)     A mask over the nose gently directs air into the throat to keep the airway open.     Continuous positive air pressure (CPAP) uses gentle air pressure to hold the airway open. CPAP is often the most effective treatment for sleep apnea. It works very well as a treatment for adults diagnosed with obstructive sleep apnea with a lot of sleepiness. But keep in mind that it  can take several adjustments before the setup is right for you.   How CPAP works  The CPAP machine  is a small portable pump that sits beside the bed. The pump sends air through a hose, which is held over your nose alone, or nose and mouth by a mask. Mild air pressure is gently pushed through your airway. The air pressure nudges sagging tissues aside. This widens the airway so you can breathe better. CPAP may be combined with other kinds of therapy for sleep apnea.   Types of air pressure treatments  There are different types of CPAP. Your doctor or CPAP technician will help you decide which type is best for you:   Basic CPAP keeps the pressure constant all night long.  A bilevel device (BiPAP) provides more pressure when you breathe in and less when you breathe out. A BiPAP machine also may be set to provide automatic breaths to maintain breathing if you stop breathing while sleeping.  An autoCPAP device automatically adjusts pressure throughout the night and in response to changes such as body position, sleep stage, and snoring.  [employer] last reviewed this educational content on 7/1/2019 © 2000-2023 [employer]. All rights reserved. This information is not intended as a substitute for professional medical care. Always follow your healthcare professional's instructions.

## 2025-02-20 RX ORDER — ROSUVASTATIN CALCIUM 10 MG/1
10 TABLET, COATED ORAL NIGHTLY
Qty: 90 TABLET | Refills: 0 | Status: SHIPPED | OUTPATIENT
Start: 2025-02-20

## 2025-05-20 NOTE — TELEPHONE ENCOUNTER
Continue with respiratory protocol  No care due was identified.  [facility] Embedded Care Due Messages. Reference number: [id number].   5/19/2025 9:34:12 PM CDT   Returned call for results

## 2025-05-29 RX ORDER — ROSUVASTATIN CALCIUM 10 MG/1
10 TABLET, COATED ORAL NIGHTLY
Qty: 90 TABLET | Refills: 0 | Status: SHIPPED | OUTPATIENT
Start: 2025-05-29

## 2025-05-29 NOTE — TELEPHONE ENCOUNTER
Last Office Visit: 11/11/2024    Last Refill:   Medication Quantity Refills Start End   rosuvastatin (CRESTOR) 10 MG Oral Tab 90 tablet 0 2/20/2025 --     Return to Clinic: 11/11/2025    Protocol: passed

## 2025-06-06 ENCOUNTER — MED REC SCAN ONLY (OUTPATIENT)
Dept: FAMILY MEDICINE CLINIC | Facility: CLINIC | Age: 50
End: 2025-06-06